# Patient Record
Sex: MALE | Race: WHITE
[De-identification: names, ages, dates, MRNs, and addresses within clinical notes are randomized per-mention and may not be internally consistent; named-entity substitution may affect disease eponyms.]

---

## 2018-11-29 ENCOUNTER — HOSPITAL ENCOUNTER (EMERGENCY)
Dept: HOSPITAL 54 - ER | Age: 28
Discharge: HOME | End: 2018-11-29
Payer: COMMERCIAL

## 2018-11-29 VITALS — HEIGHT: 72 IN | BODY MASS INDEX: 23.98 KG/M2 | WEIGHT: 177 LBS

## 2018-11-29 VITALS — SYSTOLIC BLOOD PRESSURE: 125 MMHG | DIASTOLIC BLOOD PRESSURE: 82 MMHG

## 2018-11-29 DIAGNOSIS — J02.9: Primary | ICD-10-CM

## 2018-11-29 PROCEDURE — 87070 CULTURE OTHR SPECIMN AEROBIC: CPT

## 2018-11-29 PROCEDURE — Z7610: HCPCS

## 2018-11-29 PROCEDURE — 99283 EMERGENCY DEPT VISIT LOW MDM: CPT

## 2018-11-29 PROCEDURE — A4606 OXYGEN PROBE USED W OXIMETER: HCPCS

## 2018-11-29 PROCEDURE — 87880 STREP A ASSAY W/OPTIC: CPT

## 2018-11-29 NOTE — NUR
PT BIBSELF C/O SORE THROAT X 4 DAYS. PT IS ON ANTIBIOTICS BUT WORRIED ABOUT 
SWELLING. PT AOX4. WILL CONTINUE TO MONITOR.